# Patient Record
Sex: FEMALE | Race: BLACK OR AFRICAN AMERICAN | NOT HISPANIC OR LATINO | Employment: UNEMPLOYED | URBAN - METROPOLITAN AREA
[De-identification: names, ages, dates, MRNs, and addresses within clinical notes are randomized per-mention and may not be internally consistent; named-entity substitution may affect disease eponyms.]

---

## 2019-09-06 DIAGNOSIS — I50.9 HEART FAILURE, UNSPECIFIED: Primary | ICD-10-CM

## 2019-10-16 DIAGNOSIS — R07.9 CHEST PAIN: Primary | ICD-10-CM

## 2020-03-10 ENCOUNTER — TELEPHONE (OUTPATIENT)
Dept: ENDOSCOPY | Facility: HOSPITAL | Age: 65
End: 2020-03-10

## 2020-03-10 NOTE — TELEPHONE ENCOUNTER
Referral for colonoscopy received from , [facility]. Upon reviewing medical records, it's noted this referral is for [facility]. [place], [facility] , notified.        [place] HERBERT [facility] Schedul[facility]   Caller: Unspecified (Yesterday, 11:41 AM)             Good Morning,     NP Magaly Rivera would like to refer the following patient to [facility] for a colonoscopy. I have a scanned the following patient's referral and records into . If there are any further questions in regards to the patient, please contact me at my extension.     Thank you,   [place]   Ext. [phone]   [facility]

## 2020-05-22 ENCOUNTER — TELEPHONE (OUTPATIENT)
Dept: ENDOSCOPY | Facility: HOSPITAL | Age: 65
End: 2020-05-22

## 2020-05-22 NOTE — TELEPHONE ENCOUNTER
Called pt. To set up colonoscopy. Pt. 's daughter is caregiver due to Stroke and she sated she wants Colonoscopy at [facility]. She will contact Dr. Owen at [facility] to have order sent to [facility].